# Patient Record
Sex: FEMALE | Race: WHITE
[De-identification: names, ages, dates, MRNs, and addresses within clinical notes are randomized per-mention and may not be internally consistent; named-entity substitution may affect disease eponyms.]

---

## 2018-05-11 ENCOUNTER — HOSPITAL ENCOUNTER (OUTPATIENT)
Dept: HOSPITAL 80 - BHFA | Age: 53
End: 2018-05-11
Attending: INTERNAL MEDICINE
Payer: COMMERCIAL

## 2018-05-11 DIAGNOSIS — R07.9: Primary | ICD-10-CM

## 2018-05-11 DIAGNOSIS — Z72.0: ICD-10-CM

## 2018-05-11 DIAGNOSIS — R94.31: ICD-10-CM

## 2018-05-11 DIAGNOSIS — J44.9: ICD-10-CM

## 2018-05-11 DIAGNOSIS — E78.5: ICD-10-CM

## 2018-06-01 ENCOUNTER — HOSPITAL ENCOUNTER (OUTPATIENT)
Dept: HOSPITAL 80 - BHFA | Age: 53
End: 2018-06-01
Attending: INTERNAL MEDICINE
Payer: COMMERCIAL

## 2018-06-01 DIAGNOSIS — R94.31: ICD-10-CM

## 2018-06-01 DIAGNOSIS — J44.9: ICD-10-CM

## 2018-06-01 DIAGNOSIS — R07.9: Primary | ICD-10-CM

## 2018-06-01 PROCEDURE — 93306 TTE W/DOPPLER COMPLETE: CPT

## 2018-06-01 PROCEDURE — 78452 HT MUSCLE IMAGE SPECT MULT: CPT

## 2018-06-01 PROCEDURE — A9500 TC99M SESTAMIBI: HCPCS

## 2018-06-01 PROCEDURE — 93017 CV STRESS TEST TRACING ONLY: CPT

## 2018-06-22 ENCOUNTER — HOSPITAL ENCOUNTER (OUTPATIENT)
Dept: HOSPITAL 80 - FCATH | Age: 53
Discharge: HOME | End: 2018-06-22
Attending: INTERNAL MEDICINE
Payer: COMMERCIAL

## 2018-06-22 DIAGNOSIS — F17.210: ICD-10-CM

## 2018-06-22 DIAGNOSIS — R07.89: Primary | ICD-10-CM

## 2018-06-22 DIAGNOSIS — F32.9: ICD-10-CM

## 2018-06-22 DIAGNOSIS — I42.2: ICD-10-CM

## 2018-06-22 DIAGNOSIS — E78.5: ICD-10-CM

## 2018-06-22 DIAGNOSIS — J44.9: ICD-10-CM

## 2018-06-22 LAB
INR PPP: 1.01 (ref 0.83–1.16)
PLATELET # BLD: (no result) 10^3/UL (ref 150–400)
PLATELET # BLD: 317 10^3/UL (ref 150–400)
PROTHROMBIN TIME: 13.5 SEC (ref 12–15)

## 2018-06-22 NOTE — CPIP
[f rep st]



                                                       INVASIVE CARDIAC PROCEDURE





DATE OF PROCEDURE:  06/22/2018



PROCEDURES:  

1.  Coronary angiography.

2.  Left ventriculography.



INDICATION:  

1.  Chest pain.

2.  Abnormal nuclear stress test that is low risk.



ACCESS:  Patient was prepped and draped in sterile fashion.  1% lidocaine was used to anesthetize the
 right inguinal region.  A 6-Algerian introducer sheath was placed selectively in the right common femo
ral artery via modified Seldinger technique.



CORONARY ANGIOGRAPHY:  A 6-Algerian JL4 was advanced to the left main coronary artery and images obtain
ed.  The left main coronary artery bifurcated into an LAD and circumflex coronary arteries.  The left
 main coronary artery appeared normal.  The left anterior descending coronary artery gave rise to 1 p
rominent diagonal branch as well as 2 smaller diagonal branches.  The left anterior descending corona
ry artery and its diagonal branches appeared normal.  The circumflex coronary artery is a moderate-si
zed vessel.  The circumflex coronary artery is nondominant.  The circumflex coronary artery gave rise
 to 3 OM branches.  The circumflex coronary artery and its OM branches appeared normal.  A 6-Algerian n
o-torque right catheter was advanced to the right coronary artery and images obtained.  The right cor
onary artery is dominant.  The right coronary artery appeared normal.



LEFT VENTRICULOGRAPHY:  A 6-Algerian pigtail catheter was advanced in the left ventricle and images obt
ained.  Left ventricle is normal in size and had normal systolic function.  The estimated ejection fr
action is 60%.



COMPLICATIONS:  None.



CONCLUSION:  

1.  Normal coronary arteries.

2.  Normal left ventricular size and systolic function.





Job #:  [id number]

## 2018-06-22 NOTE — CPEKG
Heart Rate: 105

RR Interval: 571

P-R Interval: 148

QRSD Interval: 84

QT Interval: 344

QTC Interval: 455

P Axis: 77

QRS Axis: 28

T Wave Axis: 9

EKG Severity - ABNORMAL ECG -

EKG Impression: SINUS TACHYCARDIA

EKG Impression: RIGHT ATRIAL ABNORMALITY

EKG Impression: CONSIDER POSTERIOR INFARCT

Electronically Signed By: Colby Henry 25-Jun-2018 02:37:55

## 2018-06-22 NOTE — PDPROPOC
Sedation Plan of Care


Sedation Plan of Care: vital signs stable, mental status noted, patient 

educated of risks, benefits, alternatives, patient can tolerate sedation


Planned drugs: fentanyl, midazolam


Mallampati Score: Class 2


Mallampati Reference Image: 





Patient passed 3-3-2 rule?: Yes

## 2018-07-05 ENCOUNTER — HOSPITAL ENCOUNTER (OUTPATIENT)
Dept: HOSPITAL 80 - FIMAGING | Age: 53
End: 2018-07-05
Attending: SPECIALIST
Payer: COMMERCIAL

## 2018-07-05 DIAGNOSIS — Z12.31: Primary | ICD-10-CM
